# Patient Record
Sex: MALE | Race: AMERICAN INDIAN OR ALASKA NATIVE
[De-identification: names, ages, dates, MRNs, and addresses within clinical notes are randomized per-mention and may not be internally consistent; named-entity substitution may affect disease eponyms.]

---

## 2021-03-31 ENCOUNTER — HOSPITAL ENCOUNTER (EMERGENCY)
Dept: HOSPITAL 53 - M ED | Age: 31
Discharge: HOME | End: 2021-03-31
Payer: COMMERCIAL

## 2021-03-31 VITALS — WEIGHT: 205.43 LBS | BODY MASS INDEX: 32.24 KG/M2 | HEIGHT: 67 IN

## 2021-03-31 VITALS — SYSTOLIC BLOOD PRESSURE: 117 MMHG | DIASTOLIC BLOOD PRESSURE: 73 MMHG

## 2021-03-31 DIAGNOSIS — R55: Primary | ICD-10-CM

## 2021-03-31 DIAGNOSIS — Z79.899: ICD-10-CM

## 2021-03-31 LAB
AMPHETAMINES UR QL SCN: NEGATIVE
BARBITURATES UR QL SCN: NEGATIVE
BASOPHILS # BLD AUTO: 0 10^3/UL (ref 0–0.2)
BASOPHILS NFR BLD AUTO: 0.4 % (ref 0–1)
BENZODIAZ UR QL SCN: NEGATIVE
BUN SERPL-MCNC: 14 MG/DL (ref 7–18)
BZE UR QL SCN: NEGATIVE
CALCIUM SERPL-MCNC: 9 MG/DL (ref 8.5–10.1)
CANNABINOIDS UR QL SCN: NEGATIVE
CHLORIDE SERPL-SCNC: 104 MEQ/L (ref 98–107)
CK MB CFR.DF SERPL CALC: 0.96
CK MB SERPL-MCNC: 1.7 NG/ML (ref ?–3.6)
CK SERPL-CCNC: 177 U/L (ref 39–308)
CO2 SERPL-SCNC: 30 MEQ/L (ref 21–32)
CREAT SERPL-MCNC: 1.09 MG/DL (ref 0.7–1.3)
EOSINOPHIL # BLD AUTO: 0 10^3/UL (ref 0–0.5)
EOSINOPHIL NFR BLD AUTO: 0.2 % (ref 0–3)
ETHANOL SERPL-MCNC: < 0.003 % (ref 0–0.01)
GFR SERPL CREATININE-BSD FRML MDRD: > 60 ML/MIN/{1.73_M2} (ref 60–?)
GLUCOSE SERPL-MCNC: 98 MG/DL (ref 70–100)
HCT VFR BLD AUTO: 45.4 % (ref 42–52)
HGB BLD-MCNC: 15.7 G/DL (ref 13.5–17.5)
LYMPHOCYTES # BLD AUTO: 0.9 10^3/UL (ref 1.5–5)
LYMPHOCYTES NFR BLD AUTO: 9.7 % (ref 24–44)
MCH RBC QN AUTO: 29.4 PG (ref 27–33)
MCHC RBC AUTO-ENTMCNC: 34.6 G/DL (ref 32–36.5)
MCV RBC AUTO: 85 FL (ref 80–96)
METHADONE UR QL SCN: NEGATIVE
MONOCYTES # BLD AUTO: 1 10^3/UL (ref 0–0.8)
MONOCYTES NFR BLD AUTO: 10.7 % (ref 2–8)
NEUTROPHILS # BLD AUTO: 7.6 10^3/UL (ref 1.5–8.5)
NEUTROPHILS NFR BLD AUTO: 78.5 % (ref 36–66)
OPIATES UR QL SCN: NEGATIVE
PCP UR QL SCN: NEGATIVE
PLATELET # BLD AUTO: 185 10^3/UL (ref 150–450)
POTASSIUM SERPL-SCNC: 4.8 MEQ/L (ref 3.5–5.1)
RBC # BLD AUTO: 5.34 10^6/UL (ref 4.3–6.1)
SODIUM SERPL-SCNC: 137 MEQ/L (ref 136–145)
TROPONIN I SERPL-MCNC: < 0.02 NG/ML (ref ?–0.1)
TSH SERPL DL<=0.005 MIU/L-ACNC: 1.11 UIU/ML (ref 0.36–3.74)
WBC # BLD AUTO: 9.7 10^3/UL (ref 4–10)

## 2021-03-31 NOTE — REP
INDICATION:

Syncope/near-syncope.



COMPARISON:

None.



TECHNIQUE:

SINGLE PORTABLE AP VIEW OF THE CHEST WAS PERFORMED.



FINDINGS:

THERE IS NO ACUTE INFILTRATE OR PULMONARY EDEMA.  LUNGS ARE CLEAR.  HEART IS NOT

SIGNIFICANTLY ENLARGED.  MEDIASTINAL SILHOUETTE IS UNREMARKABLE.  THE VISUALIZED

OSSEOUS STRUCTURES ARE INTACT.



IMPRESSION:

NO ACUTE PULMONARY DISEASE.





<Electronically signed by Thang Tavarez > 03/31/21 4623 PADMA received call from [facility] reporting concerns about taking patient home. PADMA informed her again that [facility] was willing to accept and work with family private pay. She would like to pursue this now. Call to Shorty to inform, VM left requesting call back. Electronically signed by ALEXA Dailey on 9/12/2019 at 2:11 PM      Call from Shorty at [facility]. They are able to accept today. Will contact [facility] to discuss. Family to transport at discharge.     Report: [phone]  Fax: [phone]    HENS done    Electronically signed by ALEXA Dailey on 9/12/2019 at 2:35 PM

## 2021-03-31 NOTE — ECGEPIP
[facility] - ED

                                       

                                       Test Date:    2021

Pat Name:     PIPPA SOSA             Department:   

Patient ID:   [id number]                 Room:         -

Gender:       Male                     Technician:   ED

:          1990               Requested By: APOORVA BRO

Order Number: [id number]     Reading MD:   Vinay Olson

                                 Measurements

Intervals                              Axis          

Rate:         111                      P:            48

AR:           138                      QRS:          60

QRSD:         86                       T:            22

QT:           318                                    

QTc:          432                                    

                           Interpretive Statements

Sinus tachycardia

POOR R WAVE PROGRESSION

NONSPECIFIC T WAVE ABNORMALITY(S)

NO PRIORS FOR COMPARISON

Electronically Signed on 3- 20:44:45 EDT by Vinay Olson

## 2021-03-31 NOTE — REP
INDICATION:

Syncope.



COMPARISON:

None.



TECHNIQUE:

CT BRAIN PERFORMED IN THE AXIAL PLANE.  CORONAL RECONSTRUCTION IMAGES ARE PERFORMED.



FINDINGS:

THE VENTRICLES ARE NORMAL IN SIZE AND POSITION.  THERE IS NO MIDLINE SHIFT OR MASS

EFFECT.  GRAY-WHITE DIFFERENTIATION IS WELL MAINTAINED.  THERE IS NO ACUTE

INTRACRANIAL HEMORRHAGE OR EXTRA-AXIAL FLUID COLLECTION.  BONE WINDOW EXAMINATION IS

UNREMARKABLE.  There is mild fluid in the left maxillary sinus, otherwise the

VISUALIZED MASTOID AIR CELLS AND PARANASAL SINUSES ARE CLEAR.



IMPRESSION:

NEGATIVE NONCONTRAST CT BRAIN.





<Electronically signed by Thang Tavarez > 03/31/21 1243

## 2021-12-27 ENCOUNTER — HOSPITAL ENCOUNTER (EMERGENCY)
Dept: HOSPITAL 53 - M ED | Age: 31
Discharge: LEFT BEFORE DISCHARGE | End: 2021-12-27
Payer: COMMERCIAL

## 2021-12-27 VITALS — SYSTOLIC BLOOD PRESSURE: 142 MMHG | DIASTOLIC BLOOD PRESSURE: 90 MMHG

## 2021-12-27 VITALS — WEIGHT: 206.57 LBS | BODY MASS INDEX: 32.42 KG/M2 | HEIGHT: 67 IN

## 2021-12-27 DIAGNOSIS — Z53.21: Primary | ICD-10-CM
